# Patient Record
Sex: FEMALE | Race: OTHER | HISPANIC OR LATINO | URBAN - METROPOLITAN AREA
[De-identification: names, ages, dates, MRNs, and addresses within clinical notes are randomized per-mention and may not be internally consistent; named-entity substitution may affect disease eponyms.]

---

## 2017-03-27 ENCOUNTER — EMERGENCY (EMERGENCY)
Facility: HOSPITAL | Age: 22
LOS: 1 days | Discharge: HOME | End: 2017-03-27
Attending: EMERGENCY MEDICINE | Admitting: EMERGENCY MEDICINE
Payer: MEDICAID

## 2017-03-27 VITALS
TEMPERATURE: 98 F | HEART RATE: 88 BPM | SYSTOLIC BLOOD PRESSURE: 107 MMHG | DIASTOLIC BLOOD PRESSURE: 55 MMHG | RESPIRATION RATE: 16 BRPM | OXYGEN SATURATION: 99 %

## 2017-03-27 VITALS
HEART RATE: 102 BPM | RESPIRATION RATE: 16 BRPM | OXYGEN SATURATION: 99 % | SYSTOLIC BLOOD PRESSURE: 110 MMHG | DIASTOLIC BLOOD PRESSURE: 68 MMHG | TEMPERATURE: 99 F

## 2017-03-27 PROCEDURE — 99284 EMERGENCY DEPT VISIT MOD MDM: CPT

## 2017-03-27 RX ORDER — SODIUM CHLORIDE 9 MG/ML
1000 INJECTION INTRAMUSCULAR; INTRAVENOUS; SUBCUTANEOUS ONCE
Qty: 0 | Refills: 0 | Status: COMPLETED | OUTPATIENT
Start: 2017-03-27 | End: 2017-03-27

## 2017-03-27 NOTE — ED ADULT NURSE NOTE - CHIEF COMPLAINT QUOTE
pt c/o blood in urine x a day. states lower abdominal pain when urinates on left side and bilateral flank pain. pt appears comfortable and in NAD.

## 2017-03-27 NOTE — ED PROVIDER NOTE - CARE PLAN
Principal Discharge DX:	Abdominal pain affecting pregnancy  Instructions for follow-up, activity and diet:	pls rest, dink plenty of fluids, f/u with your pbgyn as soon as possible, return for any worsening symptoms or any other concnerning symptoms

## 2017-03-27 NOTE — ED PROVIDER NOTE - PLAN OF CARE
pls rest, dink plenty of fluids, f/u with your pbgyn as soon as possible, return for any worsening symptoms or any other concnerning symptoms

## 2017-03-27 NOTE — ED PROVIDER NOTE - OBJECTIVE STATEMENT
23 y/o female, , 18 weeks pregnant, +IUP, p/w hematuria, dysuria, urinary frequency, urinary urgency, and flank pain x 3 days. Pt reports light increase of abd pain compared to her baseline. Notes vomiting at baseline for her pregnancy. Admits to some chills. Denies diarrhea, fever, and any other complaints.

## 2017-03-27 NOTE — ED ADULT NURSE NOTE - OBJECTIVE STATEMENT
Pt received in intake room 7 with reports of  'a little but of bleeding'. Pt states 'I went to the bathroom and noticed a little bit of blood on my underwear so I think its coming from my vagina'. Pt primarily Chinese speaking, requests , pt assessed by RN using  number [id number]. Pt reporting she is 18 weeks pregnant with first pregnancy, states this is the first time she has bled during pregnancy. Pt reporting 8/10 abdominal pain and back pain, pt states she had this pain throughout pregnancy. Pt also reporting dysuria. Pt awake, A&Ox3, reporting weakness since 4th week of pregnancy. Visitors at bedside. 20g PIV placed in R AC, labs drawn and sent per orders. VS documented per flow, safety maintained. Pt received in intake room 7 with reports of  'a little bit of bleeding'. Pt states 'I went to the bathroom and noticed a little bit of blood on my underwear so I think its coming from my vagina'. Pt primarily Khmer speaking, requests , pt assessed by RN using  number [id number]. Pt reporting she is 18 weeks pregnant with first pregnancy, states this is the first time she has bled during pregnancy. Pt reporting 8/10 abdominal pain and back pain, pt states she had this pain throughout pregnancy. Pt also reporting dysuria. Pt awake, A&Ox3, reporting weakness since 4th week of pregnancy. Visitors at bedside. 20g PIV placed in R AC, labs drawn and sent per orders. VS documented per flow, safety maintained.

## 2017-03-28 LAB
ALBUMIN SERPL ELPH-MCNC: 3.7 G/DL — SIGNIFICANT CHANGE UP (ref 3.3–5)
ALP SERPL-CCNC: 69 U/L — SIGNIFICANT CHANGE UP (ref 40–120)
ALT FLD-CCNC: 31 U/L — SIGNIFICANT CHANGE UP (ref 4–33)
APPEARANCE UR: CLEAR — SIGNIFICANT CHANGE UP
AST SERPL-CCNC: 32 U/L — SIGNIFICANT CHANGE UP (ref 4–32)
BACTERIA # UR AUTO: SIGNIFICANT CHANGE UP
BASOPHILS # BLD AUTO: 0.03 K/UL — SIGNIFICANT CHANGE UP (ref 0–0.2)
BASOPHILS NFR BLD AUTO: 0.2 % — SIGNIFICANT CHANGE UP (ref 0–2)
BILIRUB SERPL-MCNC: 0.3 MG/DL — SIGNIFICANT CHANGE UP (ref 0.2–1.2)
BILIRUB UR-MCNC: NEGATIVE — SIGNIFICANT CHANGE UP
BLOOD UR QL VISUAL: NEGATIVE — SIGNIFICANT CHANGE UP
BUN SERPL-MCNC: 6 MG/DL — LOW (ref 7–23)
CALCIUM SERPL-MCNC: 9.2 MG/DL — SIGNIFICANT CHANGE UP (ref 8.4–10.5)
CHLORIDE SERPL-SCNC: 101 MMOL/L — SIGNIFICANT CHANGE UP (ref 98–107)
CO2 SERPL-SCNC: 21 MMOL/L — LOW (ref 22–31)
COLOR SPEC: SIGNIFICANT CHANGE UP
CREAT SERPL-MCNC: 0.44 MG/DL — LOW (ref 0.5–1.3)
EOSINOPHIL # BLD AUTO: 0.4 K/UL — SIGNIFICANT CHANGE UP (ref 0–0.5)
EOSINOPHIL NFR BLD AUTO: 2.8 % — SIGNIFICANT CHANGE UP (ref 0–6)
GLUCOSE SERPL-MCNC: 74 MG/DL — SIGNIFICANT CHANGE UP (ref 70–99)
GLUCOSE UR-MCNC: NEGATIVE — SIGNIFICANT CHANGE UP
HCG SERPL-ACNC: SIGNIFICANT CHANGE UP MIU/ML
HCT VFR BLD CALC: 32.9 % — LOW (ref 34.5–45)
HGB BLD-MCNC: 10.9 G/DL — LOW (ref 11.5–15.5)
IMM GRANULOCYTES NFR BLD AUTO: 0.7 % — SIGNIFICANT CHANGE UP (ref 0–1.5)
KETONES UR-MCNC: NEGATIVE — SIGNIFICANT CHANGE UP
LEUKOCYTE ESTERASE UR-ACNC: NEGATIVE — SIGNIFICANT CHANGE UP
LYMPHOCYTES # BLD AUTO: 23.4 % — SIGNIFICANT CHANGE UP (ref 13–44)
LYMPHOCYTES # BLD AUTO: 3.35 K/UL — HIGH (ref 1–3.3)
MCHC RBC-ENTMCNC: 30.4 PG — SIGNIFICANT CHANGE UP (ref 27–34)
MCHC RBC-ENTMCNC: 33.1 % — SIGNIFICANT CHANGE UP (ref 32–36)
MCV RBC AUTO: 91.6 FL — SIGNIFICANT CHANGE UP (ref 80–100)
MONOCYTES # BLD AUTO: 0.95 K/UL — HIGH (ref 0–0.9)
MONOCYTES NFR BLD AUTO: 6.6 % — SIGNIFICANT CHANGE UP (ref 2–14)
MUCOUS THREADS # UR AUTO: SIGNIFICANT CHANGE UP
NEUTROPHILS # BLD AUTO: 9.49 K/UL — HIGH (ref 1.8–7.4)
NEUTROPHILS NFR BLD AUTO: 66.3 % — SIGNIFICANT CHANGE UP (ref 43–77)
NITRITE UR-MCNC: NEGATIVE — SIGNIFICANT CHANGE UP
PH UR: 6.5 — SIGNIFICANT CHANGE UP (ref 4.6–8)
PLATELET # BLD AUTO: 254 K/UL — SIGNIFICANT CHANGE UP (ref 150–400)
PMV BLD: 11.5 FL — SIGNIFICANT CHANGE UP (ref 7–13)
POTASSIUM SERPL-MCNC: 3.8 MMOL/L — SIGNIFICANT CHANGE UP (ref 3.5–5.3)
POTASSIUM SERPL-SCNC: 3.8 MMOL/L — SIGNIFICANT CHANGE UP (ref 3.5–5.3)
PROT SERPL-MCNC: 6.7 G/DL — SIGNIFICANT CHANGE UP (ref 6–8.3)
PROT UR-MCNC: NEGATIVE — SIGNIFICANT CHANGE UP
RBC # BLD: 3.59 M/UL — LOW (ref 3.8–5.2)
RBC # FLD: 13.9 % — SIGNIFICANT CHANGE UP (ref 10.3–14.5)
RBC CASTS # UR COMP ASSIST: SIGNIFICANT CHANGE UP (ref 0–?)
SODIUM SERPL-SCNC: 139 MMOL/L — SIGNIFICANT CHANGE UP (ref 135–145)
SP GR SPEC: 1.01 — SIGNIFICANT CHANGE UP (ref 1–1.03)
SQUAMOUS # UR AUTO: SIGNIFICANT CHANGE UP
UROBILINOGEN FLD QL: NORMAL E.U. — SIGNIFICANT CHANGE UP (ref 0.1–0.2)
WBC # BLD: 14.32 K/UL — HIGH (ref 3.8–10.5)
WBC # FLD AUTO: 14.32 K/UL — HIGH (ref 3.8–10.5)
WBC UR QL: SIGNIFICANT CHANGE UP (ref 0–?)

## 2017-03-28 PROCEDURE — 76830 TRANSVAGINAL US NON-OB: CPT | Mod: 26

## 2017-03-28 RX ADMIN — SODIUM CHLORIDE 1000 MILLILITER(S): 9 INJECTION INTRAMUSCULAR; INTRAVENOUS; SUBCUTANEOUS at 00:10

## 2017-03-29 LAB
BACTERIA UR CULT: SIGNIFICANT CHANGE UP
SPECIMEN SOURCE: SIGNIFICANT CHANGE UP

## 2017-04-11 ENCOUNTER — ASOB RESULT (OUTPATIENT)
Age: 22
End: 2017-04-11

## 2017-04-11 ENCOUNTER — APPOINTMENT (OUTPATIENT)
Dept: ANTEPARTUM | Facility: CLINIC | Age: 22
End: 2017-04-11

## 2017-09-03 ENCOUNTER — TRANSCRIPTION ENCOUNTER (OUTPATIENT)
Age: 22
End: 2017-09-03

## 2017-09-03 ENCOUNTER — INPATIENT (INPATIENT)
Facility: HOSPITAL | Age: 22
LOS: 3 days | Discharge: HOME | End: 2017-09-07
Attending: OBSTETRICS & GYNECOLOGY | Admitting: OBSTETRICS & GYNECOLOGY

## 2017-09-03 VITALS — WEIGHT: 121.25 LBS

## 2017-09-03 DIAGNOSIS — O26.899 OTHER SPECIFIED PREGNANCY RELATED CONDITIONS, UNSPECIFIED TRIMESTER: ICD-10-CM

## 2017-09-03 DIAGNOSIS — O48.0 POST-TERM PREGNANCY: ICD-10-CM

## 2017-09-03 LAB
BASOPHILS # BLD AUTO: 0.03 K/UL — SIGNIFICANT CHANGE UP (ref 0–0.2)
BASOPHILS NFR BLD AUTO: 0.3 % — SIGNIFICANT CHANGE UP (ref 0–2)
EOSINOPHIL # BLD AUTO: 0.18 K/UL — SIGNIFICANT CHANGE UP (ref 0–0.5)
EOSINOPHIL NFR BLD AUTO: 1.8 % — SIGNIFICANT CHANGE UP (ref 0–6)
HCT VFR BLD CALC: 35.8 % — SIGNIFICANT CHANGE UP (ref 34.5–45)
HGB BLD-MCNC: 11.7 G/DL — SIGNIFICANT CHANGE UP (ref 11.5–15.5)
IMM GRANULOCYTES # BLD AUTO: 0.06 # — SIGNIFICANT CHANGE UP
IMM GRANULOCYTES NFR BLD AUTO: 0.6 % — SIGNIFICANT CHANGE UP (ref 0–1.5)
LYMPHOCYTES # BLD AUTO: 2.5 K/UL — SIGNIFICANT CHANGE UP (ref 1–3.3)
LYMPHOCYTES # BLD AUTO: 25.7 % — SIGNIFICANT CHANGE UP (ref 13–44)
MCHC RBC-ENTMCNC: 29.8 PG — SIGNIFICANT CHANGE UP (ref 27–34)
MCHC RBC-ENTMCNC: 32.7 % — SIGNIFICANT CHANGE UP (ref 32–36)
MCV RBC AUTO: 91.3 FL — SIGNIFICANT CHANGE UP (ref 80–100)
MONOCYTES # BLD AUTO: 0.59 K/UL — SIGNIFICANT CHANGE UP (ref 0–0.9)
MONOCYTES NFR BLD AUTO: 6.1 % — SIGNIFICANT CHANGE UP (ref 2–14)
NEUTROPHILS # BLD AUTO: 6.37 K/UL — SIGNIFICANT CHANGE UP (ref 1.8–7.4)
NEUTROPHILS NFR BLD AUTO: 65.5 % — SIGNIFICANT CHANGE UP (ref 43–77)
NRBC # FLD: 0 — SIGNIFICANT CHANGE UP
PLATELET # BLD AUTO: 126 K/UL — LOW (ref 150–400)
PMV BLD: 14.3 FL — HIGH (ref 7–13)
RBC # BLD: 3.92 M/UL — SIGNIFICANT CHANGE UP (ref 3.8–5.2)
RBC # FLD: 14.9 % — HIGH (ref 10.3–14.5)
RH IG SCN BLD-IMP: POSITIVE — SIGNIFICANT CHANGE UP
WBC # BLD: 9.73 K/UL — SIGNIFICANT CHANGE UP (ref 3.8–10.5)
WBC # FLD AUTO: 9.73 K/UL — SIGNIFICANT CHANGE UP (ref 3.8–10.5)

## 2017-09-03 RX ORDER — OXYTOCIN 10 UNIT/ML
333.33 VIAL (ML) INJECTION
Qty: 20 | Refills: 0 | Status: DISCONTINUED | OUTPATIENT
Start: 2017-09-03 | End: 2017-09-03

## 2017-09-03 RX ORDER — SODIUM CHLORIDE 9 MG/ML
1000 INJECTION, SOLUTION INTRAVENOUS ONCE
Qty: 0 | Refills: 0 | Status: DISCONTINUED | OUTPATIENT
Start: 2017-09-03 | End: 2017-09-03

## 2017-09-03 RX ORDER — CITRIC ACID/SODIUM CITRATE 300-500 MG
15 SOLUTION, ORAL ORAL EVERY 4 HOURS
Qty: 0 | Refills: 0 | Status: DISCONTINUED | OUTPATIENT
Start: 2017-09-03 | End: 2017-09-03

## 2017-09-03 RX ORDER — SODIUM CHLORIDE 9 MG/ML
1000 INJECTION, SOLUTION INTRAVENOUS ONCE
Qty: 0 | Refills: 0 | Status: DISCONTINUED | OUTPATIENT
Start: 2017-09-03 | End: 2017-09-04

## 2017-09-03 RX ORDER — SODIUM CHLORIDE 9 MG/ML
1000 INJECTION, SOLUTION INTRAVENOUS
Qty: 0 | Refills: 0 | Status: DISCONTINUED | OUTPATIENT
Start: 2017-09-03 | End: 2017-09-03

## 2017-09-03 RX ORDER — OXYTOCIN 10 UNIT/ML
333.33 VIAL (ML) INJECTION
Qty: 20 | Refills: 0 | Status: DISCONTINUED | OUTPATIENT
Start: 2017-09-03 | End: 2017-09-04

## 2017-09-03 RX ORDER — CITRIC ACID/SODIUM CITRATE 300-500 MG
15 SOLUTION, ORAL ORAL EVERY 4 HOURS
Qty: 0 | Refills: 0 | Status: DISCONTINUED | OUTPATIENT
Start: 2017-09-03 | End: 2017-09-04

## 2017-09-03 RX ORDER — OXYTOCIN 10 UNIT/ML
2 VIAL (ML) INJECTION
Qty: 30 | Refills: 0 | Status: DISCONTINUED | OUTPATIENT
Start: 2017-09-03 | End: 2017-09-04

## 2017-09-03 RX ORDER — SODIUM CHLORIDE 9 MG/ML
1000 INJECTION, SOLUTION INTRAVENOUS
Qty: 0 | Refills: 0 | Status: DISCONTINUED | OUTPATIENT
Start: 2017-09-03 | End: 2017-09-04

## 2017-09-03 RX ADMIN — Medication 2 MILLIUNIT(S)/MIN: at 17:05

## 2017-09-03 RX ADMIN — SODIUM CHLORIDE 125 MILLILITER(S): 9 INJECTION, SOLUTION INTRAVENOUS at 11:01

## 2017-09-03 RX ADMIN — SODIUM CHLORIDE 125 MILLILITER(S): 9 INJECTION, SOLUTION INTRAVENOUS at 19:13

## 2017-09-03 NOTE — DISCHARGE NOTE OB - CARE PROVIDER_API CALL
Karolina Campbell (DO), Gynecology Obstetrics  Gynecology  [address] [place] [address]  Phone: [phone]  Fax: [phone]

## 2017-09-03 NOTE — DISCHARGE NOTE OB - CARE PLAN
Principal Discharge DX:	Spontaneous vaginal delivery  Goal:	Tolerating regular diet; resume normal activity  Instructions for follow-up, activity and diet:	Routine

## 2017-09-03 NOTE — DISCHARGE NOTE OB - PATIENT PORTAL LINK FT
“You can access the [employer] Patient Portal, offered by [facility], by registering with the following website: [email or web address]”

## 2017-09-03 NOTE — DISCHARGE NOTE OB - MATERIALS PROVIDED
Vaccinations/[facility] Hearing Screen Program/Tdap Vaccination (VIS Pub Date: January 24, 2012)/Back To Sleep Handout/Guide to Postpartum Care/Breastfeeding Log

## 2017-09-03 NOTE — DISCHARGE NOTE OB - HOSPITAL COURSE
23 yo  at 41+3 week presented to labor and delivery for induction of labor for post dates. PO cytotec and cervical jimenes placed. Following cervical jimenes, patient was started on pitocin. 21 yo  at 41+3 week presented to labor and delivery for induction of labor for post dates. PO cytotec and cervical jimenes placed. Following cervical jimenes, patient was started on pitocin. SROM- clear. Patient made no cervical -  section performed for CPD.

## 2017-09-04 ENCOUNTER — TRANSCRIPTION ENCOUNTER (OUTPATIENT)
Age: 22
End: 2017-09-04

## 2017-09-04 LAB
HCT VFR BLD CALC: 28.3 % — LOW (ref 34.5–45)
HGB BLD-MCNC: 9.3 G/DL — LOW (ref 11.5–15.5)
MCHC RBC-ENTMCNC: 29.8 PG — SIGNIFICANT CHANGE UP (ref 27–34)
MCHC RBC-ENTMCNC: 32.9 % — SIGNIFICANT CHANGE UP (ref 32–36)
MCV RBC AUTO: 90.7 FL — SIGNIFICANT CHANGE UP (ref 80–100)
NRBC # FLD: 0 — SIGNIFICANT CHANGE UP
PLATELET # BLD AUTO: 114 K/UL — LOW (ref 150–400)
PMV BLD: 13.3 FL — HIGH (ref 7–13)
RBC # BLD: 3.12 M/UL — LOW (ref 3.8–5.2)
RBC # FLD: 15 % — HIGH (ref 10.3–14.5)
T PALLIDUM AB TITR SER: NEGATIVE — SIGNIFICANT CHANGE UP
WBC # BLD: 10.33 K/UL — SIGNIFICANT CHANGE UP (ref 3.8–10.5)
WBC # FLD AUTO: 10.33 K/UL — SIGNIFICANT CHANGE UP (ref 3.8–10.5)

## 2017-09-04 RX ORDER — TETANUS TOXOID, REDUCED DIPHTHERIA TOXOID AND ACELLULAR PERTUSSIS VACCINE, ADSORBED 5; 2.5; 8; 8; 2.5 [IU]/.5ML; [IU]/.5ML; UG/.5ML; UG/.5ML; UG/.5ML
0.5 SUSPENSION INTRAMUSCULAR ONCE
Qty: 0 | Refills: 0 | Status: DISCONTINUED | OUTPATIENT
Start: 2017-09-04 | End: 2017-09-07

## 2017-09-04 RX ORDER — HEPARIN SODIUM 5000 [USP'U]/ML
5000 INJECTION INTRAVENOUS; SUBCUTANEOUS EVERY 12 HOURS
Qty: 0 | Refills: 0 | Status: DISCONTINUED | OUTPATIENT
Start: 2017-09-04 | End: 2017-09-07

## 2017-09-04 RX ORDER — OXYTOCIN 10 UNIT/ML
41.67 VIAL (ML) INJECTION
Qty: 20 | Refills: 0 | Status: DISCONTINUED | OUTPATIENT
Start: 2017-09-04 | End: 2017-09-04

## 2017-09-04 RX ORDER — HYDROMORPHONE HYDROCHLORIDE 2 MG/ML
0.5 INJECTION INTRAMUSCULAR; INTRAVENOUS; SUBCUTANEOUS
Qty: 0 | Refills: 0 | Status: DISCONTINUED | OUTPATIENT
Start: 2017-09-04 | End: 2017-09-04

## 2017-09-04 RX ORDER — CITRIC ACID/SODIUM CITRATE 300-500 MG
30 SOLUTION, ORAL ORAL ONCE
Qty: 0 | Refills: 0 | Status: DISCONTINUED | OUTPATIENT
Start: 2017-09-04 | End: 2017-09-04

## 2017-09-04 RX ORDER — OXYTOCIN 10 UNIT/ML
333.33 VIAL (ML) INJECTION
Qty: 20 | Refills: 0 | Status: DISCONTINUED | OUTPATIENT
Start: 2017-09-04 | End: 2017-09-04

## 2017-09-04 RX ORDER — KETOROLAC TROMETHAMINE 30 MG/ML
30 SYRINGE (ML) INJECTION EVERY 6 HOURS
Qty: 0 | Refills: 0 | Status: DISCONTINUED | OUTPATIENT
Start: 2017-09-04 | End: 2017-09-04

## 2017-09-04 RX ORDER — HYDROMORPHONE HYDROCHLORIDE 2 MG/ML
0.5 INJECTION INTRAMUSCULAR; INTRAVENOUS; SUBCUTANEOUS
Qty: 0 | Refills: 0 | Status: DISCONTINUED | OUTPATIENT
Start: 2017-09-04 | End: 2017-09-05

## 2017-09-04 RX ORDER — FENTANYL CITRATE 50 UG/ML
50 INJECTION INTRAVENOUS
Qty: 0 | Refills: 0 | Status: DISCONTINUED | OUTPATIENT
Start: 2017-09-04 | End: 2017-09-04

## 2017-09-04 RX ORDER — ONDANSETRON 8 MG/1
4 TABLET, FILM COATED ORAL ONCE
Qty: 0 | Refills: 0 | Status: DISCONTINUED | OUTPATIENT
Start: 2017-09-04 | End: 2017-09-04

## 2017-09-04 RX ORDER — DIPHENHYDRAMINE HCL 50 MG
25 CAPSULE ORAL EVERY 4 HOURS
Qty: 0 | Refills: 0 | Status: DISCONTINUED | OUTPATIENT
Start: 2017-09-04 | End: 2017-09-05

## 2017-09-04 RX ORDER — LANOLIN
1 OINTMENT (GRAM) TOPICAL
Qty: 0 | Refills: 0 | Status: DISCONTINUED | OUTPATIENT
Start: 2017-09-04 | End: 2017-09-07

## 2017-09-04 RX ORDER — FERROUS SULFATE 325(65) MG
325 TABLET ORAL DAILY
Qty: 0 | Refills: 0 | Status: DISCONTINUED | OUTPATIENT
Start: 2017-09-04 | End: 2017-09-05

## 2017-09-04 RX ORDER — NALOXONE HYDROCHLORIDE 4 MG/.1ML
0.1 SPRAY NASAL
Qty: 0 | Refills: 0 | Status: DISCONTINUED | OUTPATIENT
Start: 2017-09-04 | End: 2017-09-05

## 2017-09-04 RX ORDER — ONDANSETRON 8 MG/1
4 TABLET, FILM COATED ORAL EVERY 6 HOURS
Qty: 0 | Refills: 0 | Status: DISCONTINUED | OUTPATIENT
Start: 2017-09-04 | End: 2017-09-05

## 2017-09-04 RX ORDER — SODIUM CHLORIDE 9 MG/ML
1000 INJECTION, SOLUTION INTRAVENOUS
Qty: 0 | Refills: 0 | Status: DISCONTINUED | OUTPATIENT
Start: 2017-09-04 | End: 2017-09-05

## 2017-09-04 RX ORDER — METOCLOPRAMIDE HCL 10 MG
10 TABLET ORAL ONCE
Qty: 0 | Refills: 0 | Status: COMPLETED | OUTPATIENT
Start: 2017-09-04 | End: 2017-09-04

## 2017-09-04 RX ORDER — OXYCODONE HYDROCHLORIDE 5 MG/1
5 TABLET ORAL EVERY 4 HOURS
Qty: 0 | Refills: 0 | Status: COMPLETED | OUTPATIENT
Start: 2017-09-04 | End: 2017-09-11

## 2017-09-04 RX ORDER — SIMETHICONE 80 MG/1
80 TABLET, CHEWABLE ORAL EVERY 4 HOURS
Qty: 0 | Refills: 0 | Status: DISCONTINUED | OUTPATIENT
Start: 2017-09-04 | End: 2017-09-07

## 2017-09-04 RX ORDER — GLYCERIN ADULT
1 SUPPOSITORY, RECTAL RECTAL AT BEDTIME
Qty: 0 | Refills: 0 | Status: DISCONTINUED | OUTPATIENT
Start: 2017-09-04 | End: 2017-09-07

## 2017-09-04 RX ORDER — SODIUM CHLORIDE 9 MG/ML
1000 INJECTION, SOLUTION INTRAVENOUS
Qty: 0 | Refills: 0 | Status: DISCONTINUED | OUTPATIENT
Start: 2017-09-04 | End: 2017-09-04

## 2017-09-04 RX ORDER — DOCUSATE SODIUM 100 MG
100 CAPSULE ORAL
Qty: 0 | Refills: 0 | Status: DISCONTINUED | OUTPATIENT
Start: 2017-09-04 | End: 2017-09-05

## 2017-09-04 RX ORDER — DIPHENHYDRAMINE HCL 50 MG
25 CAPSULE ORAL EVERY 6 HOURS
Qty: 0 | Refills: 0 | Status: DISCONTINUED | OUTPATIENT
Start: 2017-09-04 | End: 2017-09-07

## 2017-09-04 RX ORDER — OXYCODONE HYDROCHLORIDE 5 MG/1
5 TABLET ORAL
Qty: 0 | Refills: 0 | Status: DISCONTINUED | OUTPATIENT
Start: 2017-09-04 | End: 2017-09-05

## 2017-09-04 RX ORDER — ACETAMINOPHEN 500 MG
975 TABLET ORAL EVERY 6 HOURS
Qty: 0 | Refills: 0 | Status: DISCONTINUED | OUTPATIENT
Start: 2017-09-04 | End: 2017-09-07

## 2017-09-04 RX ORDER — FENTANYL CITRATE 50 UG/ML
25 INJECTION INTRAVENOUS
Qty: 0 | Refills: 0 | Status: DISCONTINUED | OUTPATIENT
Start: 2017-09-04 | End: 2017-09-04

## 2017-09-04 RX ORDER — OXYCODONE HYDROCHLORIDE 5 MG/1
10 TABLET ORAL
Qty: 0 | Refills: 0 | Status: DISCONTINUED | OUTPATIENT
Start: 2017-09-04 | End: 2017-09-05

## 2017-09-04 RX ORDER — INFLUENZA VIRUS VACCINE 15; 15; 15; 15 UG/.5ML; UG/.5ML; UG/.5ML; UG/.5ML
0.5 SUSPENSION INTRAMUSCULAR ONCE
Qty: 0 | Refills: 0 | Status: DISCONTINUED | OUTPATIENT
Start: 2017-09-04 | End: 2017-09-07

## 2017-09-04 RX ORDER — FAMOTIDINE 10 MG/ML
20 INJECTION INTRAVENOUS ONCE
Qty: 0 | Refills: 0 | Status: COMPLETED | OUTPATIENT
Start: 2017-09-04 | End: 2017-09-04

## 2017-09-04 RX ORDER — OXYCODONE HYDROCHLORIDE 5 MG/1
5 TABLET ORAL
Qty: 0 | Refills: 0 | Status: COMPLETED | OUTPATIENT
Start: 2017-09-04 | End: 2017-09-11

## 2017-09-04 RX ORDER — KETOROLAC TROMETHAMINE 30 MG/ML
30 SYRINGE (ML) INJECTION EVERY 6 HOURS
Qty: 0 | Refills: 0 | Status: DISCONTINUED | OUTPATIENT
Start: 2017-09-04 | End: 2017-09-05

## 2017-09-04 RX ORDER — IBUPROFEN 200 MG
600 TABLET ORAL EVERY 6 HOURS
Qty: 0 | Refills: 0 | Status: DISCONTINUED | OUTPATIENT
Start: 2017-09-04 | End: 2017-09-04

## 2017-09-04 RX ADMIN — HEPARIN SODIUM 5000 UNIT(S): 5000 INJECTION INTRAVENOUS; SUBCUTANEOUS at 11:27

## 2017-09-04 RX ADMIN — Medication 975 MILLIGRAM(S): at 11:21

## 2017-09-04 RX ADMIN — Medication 125 MILLIUNIT(S)/MIN: at 04:44

## 2017-09-04 RX ADMIN — Medication 30 MILLIGRAM(S): at 18:40

## 2017-09-04 RX ADMIN — Medication 1 TABLET(S): at 11:21

## 2017-09-04 RX ADMIN — Medication 15 MILLILITER(S): at 01:30

## 2017-09-04 RX ADMIN — FAMOTIDINE 20 MILLIGRAM(S): 10 INJECTION INTRAVENOUS at 01:08

## 2017-09-04 RX ADMIN — Medication 30 MILLIGRAM(S): at 11:20

## 2017-09-04 RX ADMIN — Medication 100 MILLIGRAM(S): at 11:21

## 2017-09-04 RX ADMIN — Medication 975 MILLIGRAM(S): at 18:39

## 2017-09-04 RX ADMIN — Medication 10 MILLIGRAM(S): at 01:08

## 2017-09-04 RX ADMIN — Medication 30 MILLIGRAM(S): at 12:00

## 2017-09-04 RX ADMIN — Medication 30 MILLIGRAM(S): at 19:40

## 2017-09-04 NOTE — PROGRESS NOTE ADULT - SUBJECTIVE AND OBJECTIVE BOX
Post-Operative Note, C/S  She is a  22y woman who is now post-operative day 1:     Subjective:  The patient feels well.  She is ambulating.   She is tolerating regular diet.  She denies nausea and vomiting; denies fever.  She is voiding.  Her pain is controlled; incisional pain is appropriate.  She reports normal postpartum bleeding.  She is breastfeeding.  She is formula feeding.    Physical exam:    Vital Signs Last 24 Hrs  T(C): 37.1 (04 Sep 2017 10:00), Max: 37.1 (04 Sep 2017 10:00)  T(F): 98.8 (04 Sep 2017 10:00), Max: 98.8 (04 Sep 2017 10:00)  HR: 73 (04 Sep 2017 10:00) (68 - 94)  BP: 136/79 (04 Sep 2017 10:00) (112/66 - 136/79)  BP(mean): 80 (04 Sep 2017 06:00) (74 - 98)  RR: 18 (04 Sep 2017 10:00) (8 - 20)  SpO2: 98% (04 Sep 2017 10:00) (96% - 100%)    Gen: NAD  Breast: Soft, nontender, not engorged.  Abdomen: Soft, nontender, no distension , firm uterine fundus at umbilicus.  Incision: C/D/I.  Pelvic: Normal lochia noted  Ext: No calf tenderness    LABS:                        11.7   9.73  )-----------( 126      ( 03 Sep 2017 09:40 )             35.8       Rubella status:     Allergies    No Known Allergies    Intolerances      MEDICATIONS  (STANDING):  oxytocin Infusion 333.333 milliUNIT(s)/Min (1000 mL/Hr) IV Continuous <Continuous>  lactated ringers. 1000 milliLiter(s) (125 mL/Hr) IV Continuous <Continuous>  acetaminophen   Tablet 975 milliGRAM(s) Oral every 6 hours  ibuprofen  Tablet 600 milliGRAM(s) Oral every 6 hours  diphtheria/tetanus/pertussis (acellular) Vaccine (ADAcel) 0.5 milliLiter(s) IntraMuscular once  oxytocin Infusion 41.667 milliUNIT(s)/Min (125 mL/Hr) IV Continuous <Continuous>  ferrous    sulfate 325 milliGRAM(s) Oral daily  prenatal multivitamin 1 Tablet(s) Oral daily  oxyCODONE    IR 5 milliGRAM(s) Oral every 3 hours  heparin  Injectable 5000 Unit(s) SubCutaneous every 12 hours  influenza   Vaccine 0.5 milliLiter(s) IntraMuscular once  ketorolac   Injectable 30 milliGRAM(s) IV Push every 6 hours    MEDICATIONS  (PRN):  fentaNYL    Injectable 25 MICROGram(s) IV Push every 5 minutes PRN Mild Pain (1 - 3)  fentaNYL    Injectable 50 MICROGram(s) IV Push every 5 minutes PRN Moderate Pain (4 - 6)  HYDROmorphone  Injectable 0.5 milliGRAM(s) IV Push every 10 minutes PRN Severe Pain (7 - 10)  ondansetron Injectable 4 milliGRAM(s) IV Push once PRN Nausea and/or Vomiting  oxyCODONE    IR 5 milliGRAM(s) Oral every 3 hours PRN Mild Pain  oxyCODONE    IR 10 milliGRAM(s) Oral every 3 hours PRN Moderate Pain  HYDROmorphone  Injectable 0.5 milliGRAM(s) IV Push every 3 hours PRN Severe Pain  naloxone Injectable 0.1 milliGRAM(s) IV Push every 3 minutes PRN For ANY of the following changes in patient status:  A. RR LESS THAN 10 breaths per minute, B. Oxygen saturation LESS THAN 90%, C. Sedation score of 6  ondansetron Injectable 4 milliGRAM(s) IV Push every 6 hours PRN Nausea  diphenhydrAMINE   Injectable 25 milliGRAM(s) IV Push every 4 hours PRN Pruritus  simethicone 80 milliGRAM(s) Chew every 4 hours PRN Gas  diphenhydrAMINE   Capsule 25 milliGRAM(s) Oral every 6 hours PRN Itching  glycerin Suppository - Adult 1 Suppository(s) Rectal at bedtime PRN Constipation  docusate sodium 100 milliGRAM(s) Oral two times a day PRN Stool Softening  lanolin Ointment 1 Application(s) Topical every 3 hours PRN Sore Nipples  oxyCODONE    IR 5 milliGRAM(s) Oral every 4 hours PRN Severe Pain (7 - 10)        Assessment and Plan  POD #1 s/p C/S.  Doing well.  Encourage ambulation.  Incisional care and PO instructions reviewed.  [facility].

## 2017-09-05 RX ORDER — OXYCODONE HYDROCHLORIDE 5 MG/1
5 TABLET ORAL EVERY 4 HOURS
Qty: 0 | Refills: 0 | Status: DISCONTINUED | OUTPATIENT
Start: 2017-09-05 | End: 2017-09-07

## 2017-09-05 RX ORDER — DOCUSATE SODIUM 100 MG
100 CAPSULE ORAL
Qty: 0 | Refills: 0 | Status: DISCONTINUED | OUTPATIENT
Start: 2017-09-05 | End: 2017-09-07

## 2017-09-05 RX ORDER — FERROUS SULFATE 325(65) MG
325 TABLET ORAL
Qty: 0 | Refills: 0 | Status: DISCONTINUED | OUTPATIENT
Start: 2017-09-05 | End: 2017-09-07

## 2017-09-05 RX ORDER — OXYCODONE HYDROCHLORIDE 5 MG/1
5 TABLET ORAL
Qty: 0 | Refills: 0 | Status: DISCONTINUED | OUTPATIENT
Start: 2017-09-05 | End: 2017-09-07

## 2017-09-05 RX ORDER — IBUPROFEN 200 MG
600 TABLET ORAL EVERY 6 HOURS
Qty: 0 | Refills: 0 | Status: DISCONTINUED | OUTPATIENT
Start: 2017-09-05 | End: 2017-09-07

## 2017-09-05 RX ORDER — ASCORBIC ACID 60 MG
500 TABLET,CHEWABLE ORAL DAILY
Qty: 0 | Refills: 0 | Status: DISCONTINUED | OUTPATIENT
Start: 2017-09-05 | End: 2017-09-07

## 2017-09-05 RX ADMIN — Medication 1 TABLET(S): at 10:20

## 2017-09-05 RX ADMIN — Medication 600 MILLIGRAM(S): at 19:01

## 2017-09-05 RX ADMIN — Medication 100 MILLIGRAM(S): at 10:22

## 2017-09-05 RX ADMIN — Medication 325 MILLIGRAM(S): at 10:20

## 2017-09-05 RX ADMIN — Medication 975 MILLIGRAM(S): at 10:21

## 2017-09-05 RX ADMIN — HEPARIN SODIUM 5000 UNIT(S): 5000 INJECTION INTRAVENOUS; SUBCUTANEOUS at 00:02

## 2017-09-05 RX ADMIN — Medication 975 MILLIGRAM(S): at 05:27

## 2017-09-05 RX ADMIN — Medication 100 MILLIGRAM(S): at 05:28

## 2017-09-05 RX ADMIN — Medication 600 MILLIGRAM(S): at 10:20

## 2017-09-05 RX ADMIN — Medication 500 MILLIGRAM(S): at 10:21

## 2017-09-05 RX ADMIN — Medication 975 MILLIGRAM(S): at 18:05

## 2017-09-05 RX ADMIN — Medication 600 MILLIGRAM(S): at 18:05

## 2017-09-05 RX ADMIN — Medication 325 MILLIGRAM(S): at 18:06

## 2017-09-05 RX ADMIN — Medication 325 MILLIGRAM(S): at 09:43

## 2017-09-05 RX ADMIN — Medication 600 MILLIGRAM(S): at 10:52

## 2017-09-05 RX ADMIN — HEPARIN SODIUM 5000 UNIT(S): 5000 INJECTION INTRAVENOUS; SUBCUTANEOUS at 10:18

## 2017-09-05 NOTE — PROGRESS NOTE ADULT - PROBLEM SELECTOR PLAN 1
-Encourage Ambulation  -Continue with regular diet  -Heparin, SCDs, and ambulation for DVT ppx  -Discontinue jimenes  -Check CBC  -Analgesia as needed

## 2017-09-05 NOTE — PROGRESS NOTE ADULT - SUBJECTIVE AND OBJECTIVE BOX
Section/Postpartum-PCS   VENESSA DEAN is a  22y woman G1 P 1, who is now post-operative day: 1    Subjective:  The patient feels well.  She is ambulating.   She is tolerating regular diet.  She denies nausea and vomiting.  She is voiding.  Her pain is controlled.  She reports normal postpartum bleeding.  She is breastfeeding.  She is formula feeding.    Physical exam:    Vital Signs Last 24 Hrs  T(C): 37 (05 Sep 2017 05:01), Max: 37 (05 Sep 2017 05:01)  T(F): 98.6 (05 Sep 2017 05:01), Max: 98.6 (05 Sep 2017 05:01)  HR: 86 (05 Sep 2017 05:01) (81 - 86)  BP: 118/67 (05 Sep 2017 05:01) (95/65 - 118/67)  BP(mean): --  RR: 18 (05 Sep 2017 05:01) (18 - 19)  SpO2: 100% (05 Sep 2017 05:01) (99% - 100%)    General: alert and oriented in no acute distress.  Breast: Soft, nontender, not engorged.  Abdomen: Soft, nontender, not distended , firm uterine fundus at umbilicus, BS (+), Flatus (+), Bowel Movement (+)  Incision: Clean, dry, and intact, bandage has been removed  Pelvic: Normal lochia noted  Ext: No calf tenderness  Lochia: not excessive    LABS:                        9.3    10.33 )-----------( 114      ( 04 Sep 2017 18:30 )             28.3       Rubella status:  Immune    Blood Type:    Blood Typing (ABO + Rho D) (17 @ 10:03)    Rh Interpretation: Positive    ABO Interpretation: O                  Allergies    No Known Allergies    Intolerances      MEDICATIONS  (STANDING):  acetaminophen   Tablet 975 milliGRAM(s) Oral every 6 hours  diphtheria/tetanus/pertussis (acellular) Vaccine (ADAcel) 0.5 milliLiter(s) IntraMuscular once  prenatal multivitamin 1 Tablet(s) Oral daily  heparin  Injectable 5000 Unit(s) SubCutaneous every 12 hours  influenza   Vaccine 0.5 milliLiter(s) IntraMuscular once  oxyCODONE    IR 5 milliGRAM(s) Oral every 3 hours  ferrous    sulfate 325 milliGRAM(s) Oral three times a day with meals  docusate sodium 100 milliGRAM(s) Oral two times a day  ascorbic acid 500 milliGRAM(s) Oral daily  ibuprofen  Tablet 600 milliGRAM(s) Oral every 6 hours    MEDICATIONS  (PRN):  simethicone 80 milliGRAM(s) Chew every 4 hours PRN Gas  diphenhydrAMINE   Capsule 25 milliGRAM(s) Oral every 6 hours PRN Itching  glycerin Suppository - Adult 1 Suppository(s) Rectal at bedtime PRN Constipation  lanolin Ointment 1 Application(s) Topical every 3 hours PRN Sore Nipples  oxyCODONE    IR 5 milliGRAM(s) Oral every 4 hours PRN Severe Pain (7 - 10)        Assessment and Plan  POD #  1  s/p PCS  Doing well.  Encourage ambulation, may shower, routine postop care  Thrombocytopenia

## 2017-09-05 NOTE — PROGRESS NOTE ADULT - SUBJECTIVE AND OBJECTIVE BOX
ANESTHESIA POSTOP CHECK    22y Female POSTOP DAY 1 S/P     Vital Signs Last 24 Hrs  T(C): 37 (05 Sep 2017 05:01), Max: 37 (05 Sep 2017 05:01)  T(F): 98.6 (05 Sep 2017 05:01), Max: 98.6 (05 Sep 2017 05:01)  HR: 86 (05 Sep 2017 05:01) (81 - 86)  BP: 118/67 (05 Sep 2017 05:01) (95/65 - 118/67)  BP(mean): --  RR: 18 (05 Sep 2017 05:01) (18 - 19)  SpO2: 100% (05 Sep 2017 05:01) (99% - 100%)  I&O's Summary    04 Sep 2017 07:01  -  05 Sep 2017 07:00  --------------------------------------------------------  IN: 1000 mL / OUT: 2750 mL / NET: -1750 mL        [x NO APPARENT ANESTHESIA COMPLICATIONS      Comments:

## 2017-09-05 NOTE — PROGRESS NOTE ADULT - SUBJECTIVE AND OBJECTIVE BOX
Postpartum Note,  Section   22y year old woman post-operative day 1 from uncomplicated primary LTCS.  No acute events overnight.  Patient has no complaints and pain is well-controlled.  Patient is tolerating regular diet, passing flatus, ambulating, has a jimenes catheter in place.    Physical exam:    Vital Signs Last 24 Hrs  T(C): 37 (05 Sep 2017 05:01), Max: 37.1 (04 Sep 2017 10:00)  T(F): 98.6 (05 Sep 2017 05:), Max: 98.8 (04 Sep 2017 10:00)  HR: 86 (05 Sep 2017 05:) (73 - 86)  BP: 118/67 (05 Sep 2017 05:) (95/65 - 136/79)  BP(mean): --  RR: 18 (05 Sep 2017 05:) (18 - 19)  SpO2: 100% (05 Sep 2017 05:) (98% - 100%)    -04 @ 07:01  -  09-05 @ 07:00  --------------------------------------------------------  IN: 1000 mL / OUT: 2750 mL / NET: -1750 mL        Gen: NAD  Abdomen: Soft, nontender, no distension , firm uterine fundus at umbilicus.  Incision: Clean, dry, and intact with steri strips  Pelvic: Normal lochia noted  Ext: No calf tenderness    LABS:                        9.3    10.33 )-----------( 114      ( 04 Sep 2017 18:30 )             28.3                         11.7   9.73  )-----------( 126      ( 03 Sep 2017 09:40 )             35.8

## 2017-09-05 NOTE — PROGRESS NOTE ADULT - SUBJECTIVE AND OBJECTIVE BOX
Postop Day  __1_ s/p   C- Section    THERAPY:    [ x ] Spinal morphine _.2___ mg  [  ] Epidural morphine ___ mg  [  ] IV PCA Hydromophone 1 mg/ml    OBJECTIVE:    Sedation Score:	  [x  ] Alert	    [  ] Drowsy        [  ] Arousable	[  ] Asleep	[  ] Unresponsive    Side Effects:	  [ x ] None	     [  ] Nausea        [  ] Pruritus        [  ] Weaknes   [  ] Numbness   [  ] Other:        ASSESSMENT/ PLAN  [ x ] Continue   [  ] Discpntinue   [   Comments:

## 2017-09-06 LAB
HCT VFR BLD CALC: 29.2 % — LOW (ref 34.5–45)
HGB BLD-MCNC: 9.6 G/DL — LOW (ref 11.5–15.5)
MCHC RBC-ENTMCNC: 30 PG — SIGNIFICANT CHANGE UP (ref 27–34)
MCHC RBC-ENTMCNC: 32.9 % — SIGNIFICANT CHANGE UP (ref 32–36)
MCV RBC AUTO: 91.3 FL — SIGNIFICANT CHANGE UP (ref 80–100)
NRBC # FLD: 0 — SIGNIFICANT CHANGE UP
PLATELET # BLD AUTO: 131 K/UL — LOW (ref 150–400)
PMV BLD: 13.1 FL — HIGH (ref 7–13)
RBC # BLD: 3.2 M/UL — LOW (ref 3.8–5.2)
RBC # FLD: 15.4 % — HIGH (ref 10.3–14.5)
WBC # BLD: 12.5 K/UL — HIGH (ref 3.8–10.5)
WBC # FLD AUTO: 12.5 K/UL — HIGH (ref 3.8–10.5)

## 2017-09-06 RX ADMIN — Medication 500 MILLIGRAM(S): at 11:58

## 2017-09-06 RX ADMIN — HEPARIN SODIUM 5000 UNIT(S): 5000 INJECTION INTRAVENOUS; SUBCUTANEOUS at 11:59

## 2017-09-06 RX ADMIN — Medication 600 MILLIGRAM(S): at 23:00

## 2017-09-06 RX ADMIN — Medication 600 MILLIGRAM(S): at 18:12

## 2017-09-06 RX ADMIN — Medication 600 MILLIGRAM(S): at 12:45

## 2017-09-06 RX ADMIN — Medication 325 MILLIGRAM(S): at 11:58

## 2017-09-06 RX ADMIN — Medication 325 MILLIGRAM(S): at 18:12

## 2017-09-06 RX ADMIN — Medication 600 MILLIGRAM(S): at 11:58

## 2017-09-06 RX ADMIN — Medication 975 MILLIGRAM(S): at 06:25

## 2017-09-06 RX ADMIN — Medication 975 MILLIGRAM(S): at 00:08

## 2017-09-06 RX ADMIN — Medication 600 MILLIGRAM(S): at 18:45

## 2017-09-06 RX ADMIN — Medication 975 MILLIGRAM(S): at 23:01

## 2017-09-06 RX ADMIN — Medication 975 MILLIGRAM(S): at 18:13

## 2017-09-06 RX ADMIN — Medication 325 MILLIGRAM(S): at 08:07

## 2017-09-06 RX ADMIN — Medication 600 MILLIGRAM(S): at 06:25

## 2017-09-06 RX ADMIN — Medication 100 MILLIGRAM(S): at 18:12

## 2017-09-06 RX ADMIN — HEPARIN SODIUM 5000 UNIT(S): 5000 INJECTION INTRAVENOUS; SUBCUTANEOUS at 22:58

## 2017-09-06 RX ADMIN — Medication 600 MILLIGRAM(S): at 00:09

## 2017-09-06 RX ADMIN — Medication 600 MILLIGRAM(S): at 01:00

## 2017-09-06 RX ADMIN — HEPARIN SODIUM 5000 UNIT(S): 5000 INJECTION INTRAVENOUS; SUBCUTANEOUS at 00:08

## 2017-09-06 RX ADMIN — Medication 975 MILLIGRAM(S): at 11:59

## 2017-09-06 RX ADMIN — Medication 1 TABLET(S): at 11:58

## 2017-09-06 RX ADMIN — Medication 100 MILLIGRAM(S): at 06:25

## 2017-09-06 NOTE — PROGRESS NOTE ADULT - SUBJECTIVE AND OBJECTIVE BOX
Patient assessed at 0818.  Subjective  Pain: Patient denies any pain at the time of assessment. Pain being managed well by pain management protocol.  Complaints: None. Patient denies any headache, blur vision and/or dizziness.  Milestones:  Alert and orientedx3. Out of bed ambulating. Positive flatus. Positive bowel movement. Voiding.  Tolerating a regular diet.  Infant feeding: Breastfeeding  Feeding related issues and/or concerns:None    Objective  Vital Signs:  Vital Signs Last 24 Hrs  T(C): 36.8 (06 Sep 2017 06:00), Max: 37.2 (05 Sep 2017 21:27)  T(F): 98.2 (06 Sep 2017 06:00), Max: 99 (05 Sep 2017 21:27)  HR: 81 (06 Sep 2017 06:00) (79 - 91)  BP: 113/71 (06 Sep 2017 06:00) (113/71 - 119/74)  BP(mean): --  RR: 18 (06 Sep 2017 06:00) (18 - 19)  SpO2: 99% (06 Sep 2017 06:00) (98% - 99%)    Labs:                        9.6    12.50 )-----------( 131      ( 06 Sep 2017 05:30 )             29.2     Blood Type: Opositive  Rubella: Immune  RPR: nonreactive    Assessment  21y/o     Day#2    primary post-operative  section delivery for arrest of dilatation, arrest of descent                                        Condition: Stable  Past Medical History significant for: None  Current Issues: none  Breasts: soft, nontender  Nipples: intact  Abdomen: Soft, nondistended and nontender. Bowel sounds present. Fundus firm  Abdominal incision: Clean, dry and intact with steri strips. Patient encouraged to wear abdominal binder for support.  Vaginal: Lochia light rubra  Extremities: Edema noted bilaterally to lower extremities, negative Phu's Sign, nontender. Positive pedal pulses  Other relevant physical exam findings:    Plan  Plan: Increase ambulation, analgesia PRN and pain medication protocol standing oxycodone, ibuprofen and acetaminophen.  Diet: Regular diet  Orders: none    Continue routine post-operative and postpartum care.

## 2017-09-06 NOTE — PROGRESS NOTE ADULT - SUBJECTIVE AND OBJECTIVE BOX
Post-Operative Note, C/S  She is a  22y woman who is now post-operative day: 2    Subjective:  The patient feels well.  She is ambulating.   She is tolerating regular diet.  She denies nausea and vomiting; denies fever.  She is voiding.  Her pain is controlled; incisional pain is appropriate.  She reports normal postpartum bleeding.  Physical exam:    Vital Signs Last 24 Hrs  T(C): 36.7 (06 Sep 2017 14:01), Max: 37.2 (05 Sep 2017 21:27)  T(F): 98.1 (06 Sep 2017 14:01), Max: 99 (05 Sep 2017 21:27)  HR: 69 (06 Sep 2017 14:01) (69 - 81)  BP: 124/71 (06 Sep 2017 14:01) (113/71 - 124/71)  BP(mean): --  RR: 18 (06 Sep 2017 14:01) (18 - 19)  SpO2: 100% (06 Sep 2017 14:01) (98% - 100%)    Gen: NAD  Breast: Soft, nontender, not engorged.  Abdomen: Soft, nontender, no distension , firm uterine fundus at umbilicus.  Incision: C/D/I.  Pelvic: Normal lochia noted  Ext: No calf tenderness    LABS:                        9.6    12.50 )-----------( 131      ( 06 Sep 2017 05:30 )             29.2           Allergies    No Known Allergies          MEDICATIONS  (STANDING):  acetaminophen   Tablet 975 milliGRAM(s) Oral every 6 hours  diphtheria/tetanus/pertussis (acellular) Vaccine (ADAcel) 0.5 milliLiter(s) IntraMuscular once  prenatal multivitamin 1 Tablet(s) Oral daily  heparin  Injectable 5000 Unit(s) SubCutaneous every 12 hours  influenza   Vaccine 0.5 milliLiter(s) IntraMuscular once  oxyCODONE    IR 5 milliGRAM(s) Oral every 3 hours  ferrous    sulfate 325 milliGRAM(s) Oral three times a day with meals  docusate sodium 100 milliGRAM(s) Oral two times a day  ascorbic acid 500 milliGRAM(s) Oral daily  ibuprofen  Tablet 600 milliGRAM(s) Oral every 6 hours    MEDICATIONS  (PRN):  simethicone 80 milliGRAM(s) Chew every 4 hours PRN Gas  diphenhydrAMINE   Capsule 25 milliGRAM(s) Oral every 6 hours PRN Itching  glycerin Suppository - Adult 1 Suppository(s) Rectal at bedtime PRN Constipation  lanolin Ointment 1 Application(s) Topical every 3 hours PRN Sore Nipples  oxyCODONE    IR 5 milliGRAM(s) Oral every 4 hours PRN Severe Pain (7 - 10)

## 2017-09-06 NOTE — PROGRESS NOTE ADULT - ASSESSMENT
Assessment and Plan  POD #2 s/p C/S.  Doing well.  Encourage ambulation.  Incisional care and PO instructions reviewed.  [facility].

## 2017-09-07 VITALS
DIASTOLIC BLOOD PRESSURE: 73 MMHG | OXYGEN SATURATION: 100 % | RESPIRATION RATE: 17 BRPM | HEART RATE: 73 BPM | TEMPERATURE: 98 F | SYSTOLIC BLOOD PRESSURE: 101 MMHG

## 2017-09-07 RX ADMIN — Medication 600 MILLIGRAM(S): at 06:56

## 2017-09-07 RX ADMIN — Medication 975 MILLIGRAM(S): at 05:57

## 2017-09-07 RX ADMIN — Medication 600 MILLIGRAM(S): at 05:56

## 2017-09-07 RX ADMIN — Medication 600 MILLIGRAM(S): at 00:00

## 2017-09-07 RX ADMIN — Medication 100 MILLIGRAM(S): at 05:56

## 2017-09-07 NOTE — PROGRESS NOTE ADULT - SUBJECTIVE AND OBJECTIVE BOX
SUBJECTIVE:    Pain: Controlled    Complaints: None    MILESTONES:    Alert and Oriented x 3  [ x ]  Out of bed/ ambulating. [ x ]  Flatus:   Positive [ x ]  Negative [  ]  Bowel movement  [  ] Positive [  ] Negative   Voiding [x  ] Due to void [  ]   Pacheco/Indwelling catheter in place [  ]  Diet: Regular [ x ]  Clears [  ]  NPO [  ]    Infant feeding:  Breast [  ]   Bottle [  ]  Both [  ]  Feeding related issues and/or concerns:      OBJECTIVE:  T(C): 36.6 (17 @ 06:00), Max: 37 (17 @ 21:36)  HR: 73 (17 @ 06:00) (68 - 73)  BP: 101/73 (17 @ 06:00) (101/73 - 124/71)  RR: 17 (17 @ 06:00) (17 - 18)  SpO2: 100% (17 @ 06:00) (98% - 100%)  Wt(kg): --                        9.6    12.50 )-----------( 131      ( 06 Sep 2017 05:30 )             29.2           Blood Type: O Positive    RPR: Negative          MEDICATIONS  (STANDING):  acetaminophen   Tablet 975 milliGRAM(s) Oral every 6 hours  diphtheria/tetanus/pertussis (acellular) Vaccine (ADAcel) 0.5 milliLiter(s) IntraMuscular once  prenatal multivitamin 1 Tablet(s) Oral daily  heparin  Injectable 5000 Unit(s) SubCutaneous every 12 hours  influenza   Vaccine 0.5 milliLiter(s) IntraMuscular once  oxyCODONE    IR 5 milliGRAM(s) Oral every 3 hours  ferrous    sulfate 325 milliGRAM(s) Oral three times a day with meals  docusate sodium 100 milliGRAM(s) Oral two times a day  ascorbic acid 500 milliGRAM(s) Oral daily  ibuprofen  Tablet 600 milliGRAM(s) Oral every 6 hours    MEDICATIONS  (PRN):  simethicone 80 milliGRAM(s) Chew every 4 hours PRN Gas  diphenhydrAMINE   Capsule 25 milliGRAM(s) Oral every 6 hours PRN Itching  glycerin Suppository - Adult 1 Suppository(s) Rectal at bedtime PRN Constipation  lanolin Ointment 1 Application(s) Topical every 3 hours PRN Sore Nipples  oxyCODONE    IR 5 milliGRAM(s) Oral every 4 hours PRN Severe Pain (7 - 10)        ASSESSMENT:    22y     G 1     P   1001      PO Day#  3        Delivery: Primary [  ]    Repeat [  ]                                         Indication of procedure: Arrest    Condition: Stable    Past Medical History significant for: HPI:      Current Issues:    Breasts:  Soft [x  ]   Engorged [  ]  Nipples:  Abdomen: Soft [ x ]   Distended [  ] Nontender [  ]   Bowel sounds :  Present [  ]  Absent [  ]   Fundus:  Firm [x  ]  Boggy [  ]  Abdominal incision: Clean, Dry and Intact [x  ]  Staples [  ] Steri Strips [ x ] Dermabond [  ] Sutures [  ]    Patient wearing abdominal binder for support.    Vaginal: Lochia:  Heavy [  ]  Moderate [ x ]   Scant [  ]  Extremities: Edema [  ] Negative Phu's Sign [  ] Nontender [place]  [ x ] Positive pedal pulses [  ]    Other relevant physical exam findings:      PLAN:    Plan: Increase ambulation, analgesia PRN and pain medication protocol standing oxycodone, ibuprofen and acetaminophen.    Diet: Regular diet    Continue routine post-operative and postpartum care.     Discharge Planning [ x ]    For discharge Today  [ x   ]    Consults:  Social Work [  ]  Lactation [ x ]  Other [         ]

## 2017-09-15 ENCOUNTER — TRANSCRIPTION ENCOUNTER (OUTPATIENT)
Age: 22
End: 2017-09-15

## 2017-12-13 NOTE — DISCHARGE NOTE OB - FUNCTIONAL STATUS DATE
I will START or STAY ON the medications listed below when I get home from the hospital:    Percocet 5/325 oral tablet  -- 1-2 tab(s) by mouth every 4 hours, As Needed MDD:8 tabs    -- Caution federal law prohibits the transfer of this drug to any person other  than the person for whom it was prescribed.  May cause drowsiness.  Alcohol may intensify this effect.  Use care when operating dangerous machinery.  This prescription cannot be refilled.  This product contains acetaminophen.  Do not use  with any other product containing acetaminophen to prevent possible liver damage.  Using more of this medication than prescribed may cause serious breathing problems.    -- Indication: For pain I will START or STAY ON the medications listed below when I get home from the hospital:    Percocet 5/325 oral tablet  -- 1-2 tab(s) by mouth every 4 hours, As Needed MDD:8 tabs    -- Caution federal law prohibits the transfer of this drug to any person other  than the person for whom it was prescribed.  May cause drowsiness.  Alcohol may intensify this effect.  Use care when operating dangerous machinery.  This prescription cannot be refilled.  This product contains acetaminophen.  Do not use  with any other product containing acetaminophen to prevent possible liver damage.  Using more of this medication than prescribed may cause serious breathing problems.    -- Indication: For pain    Ecotrin 325 mg oral delayed release tablet  -- 1 tab(s) by mouth once a day   -- Swallow whole.  Do not crush.  Take with food or milk.    -- Indication: For vte ppx 05-Sep-2017 07-Sep-2017

## 2019-07-24 NOTE — DISCHARGE NOTE OB - NS MD DC PLAN IMMU FLU REF OTH
NEW OFFICE VISIT        Patient: Allison Grover Date of Service: 2019   : 1948 MRN: [id number]     SUBJECTIVE:   HISTORY OF PRESENT ILLNESS:  Allison Grover is a 71 year old female with HTN, COPD, CHF who is new to the area who presents to establish care.  She recently relocated from [place], [place].  She was seen by cardiology, pulmonology and gyne (fibroids) and would like names of local specialists.  She reports significant BLE edema on Norvasc - stopped on her own about 8 days ago because of it.  BP has been elevated since then and has experience some SAMPSON also.  Edema has improved though.   Denies chest pain, palpitations, fatigue, PND, orthopnea, syncope, presyncope, etc.      Reports hx of ARF about 1 year ago - 'dehydrated'.  IVF x several days.  Renal function has been 'normal' since then.      Limits her sodium intake.  Limits caffeine to 3/day (coffee, soda).  Is exercising regularly - swims.  Stopped smoking .  Rarely drinks.      Colonoscopy - normal .  Last MMG  - normal.  Pap was performed w/ eval by gyne for post menopausal bleeding (fibroids) .  Last Dexa  - ? Results.       PAST MEDICAL HISTORY:  No past medical history on file.    MEDICATIONS:  Current Outpatient Medications   Medication Sig   • carvedilol (COREG) 12.5 MG tablet Take 1 tablet by mouth 2 times daily (with meals).   • umeclidinium-vilanterol (ANORO ELLIPTA) 62.5-25 MCG/INH inhaler Inhale 1 puff into the lungs daily.   • losartan (COZAAR) 50 MG tablet Take 1 tablet by mouth daily.     No current facility-administered medications for this visit.        ALLERGIES:  ALLERGIES:  No Known Allergies    PAST SURGICAL HISTORY:  No past surgical history on file.    FAMILY HISTORY:  No family history on file.    SOCIAL HISTORY:  Social History     Tobacco Use   • Smoking status: Not on file   Substance Use Topics   • Alcohol use: Not on file   • Drug use: Not on file       Review of Systems    Constitutional: Negative for chills, fatigue, fever and unexpected weight change.   HENT: Negative for congestion, rhinorrhea, sore throat and trouble swallowing.    Eyes: Negative for visual disturbance.   Respiratory: Positive for shortness of breath. Negative for cough, chest tightness and wheezing.    Cardiovascular: Positive for leg swelling. Negative for chest pain and palpitations.   Gastrointestinal: Negative for abdominal pain, blood in stool, constipation, diarrhea, nausea and vomiting.   Genitourinary: Negative for dysuria, frequency, hematuria and urgency.   Musculoskeletal: Negative for arthralgias, back pain, gait problem and myalgias.   Skin: Negative for rash.   Neurological: Negative for dizziness, weakness, light-headedness, numbness and headaches.   Psychiatric/Behavioral: Negative for confusion, decreased concentration and sleep disturbance.         OBJECTIVE:     Visit Vitals  /82   Pulse 66   Resp 18   Ht 6' 1\" (1.854 m)   Wt 122.7 kg (270 lb 6.3 oz)   SpO2 93%   BMI 35.67 kg/m²         Physical Exam   Constitutional: She is oriented to person, place, and time. She appears well-developed and well-nourished. She is cooperative. No distress.   HENT:   Head: Normocephalic and atraumatic.   Right Ear: External ear and ear canal normal. No drainage or swelling. Tympanic membrane is not erythematous and not bulging. No middle ear effusion.   Left Ear: External ear and ear canal normal. No drainage or swelling. Tympanic membrane is not erythematous and not bulging.  No middle ear effusion.   Nose: Nose normal. No mucosal edema, rhinorrhea or septal deviation.   Mouth/Throat: Uvula is midline, oropharynx is clear and moist and mucous membranes are normal. No oropharyngeal exudate.   Eyes: Conjunctivae and lids are normal. Right conjunctiva is not injected. Left conjunctiva is not injected.   Neck: Trachea normal. Neck supple. No thyromegaly present.   Cardiovascular: Normal rate, S1 normal, S2  normal, normal heart sounds, intact distal pulses and normal pulses.   No murmur heard.  Pulmonary/Chest: Effort normal and breath sounds normal. No respiratory distress. She has no wheezes. She has no rales.   Abdominal: Soft. Normal appearance and bowel sounds are normal. She exhibits no distension. There is no tenderness.   Musculoskeletal: She exhibits no edema.   Neurological: She is alert and oriented to person, place, and time.   Skin: Skin is warm, dry and intact. No rash noted. She is not diaphoretic.   Psychiatric: She has a normal mood and affect. Her speech is normal and behavior is normal.         DIAGNOSTIC STUDIES:   LAB RESULTS:    None.    Assessment AND PLAN:        Essential hypertension - elevated, but is not taking Norvasc (10mg) due to significant edema. Has tolerated ARB (cough on ACEI).  Trial of Cozaar 50mg.  BP check 1 week.  Continue Coreg.  To establish w/ cardiology.  Will have labs performed and follow up.      - carvedilol (COREG) 12.5 MG tablet; Take 1 tablet by mouth 2 times daily (with meals).  Dispense: 30 tablet; Refill: 1  - losartan (COZAAR) 50 MG tablet; Take 1 tablet by mouth daily.  Dispense: 30 tablet; Refill: 1      Chronic obstructive pulmonary disease, unspecified COPD type - stopped smoking 2010.  Doing well w/ Anoro Ellipta.  (SAMPSON only since stopping Norvasc.)  To establish w/ pulmonology.     - umeclidinium-vilanterol (ANORO ELLIPTA) 62.5-25 MCG/INH inhaler; Inhale 1 puff into the lungs daily.  Dispense: 30 each; Refill: 1      Chronic congestive heart failure, unspecified heart failure type - trace edema BLE.  Establishing w/ cardiology.  Treating BP (adding Cozaar to Coreg).  Returns next week for BP check and labs.        Health care maintenance - MMG order today.  Diet/exercise, wt loss goals reviewed.  Will get baseline labs and have her follow up.     - MAMMO SCREENING BILATERAL W CIRA; Future         Instructions provided as documented in the AVS.    The patient  indicated understanding of the diagnosis and agreed with the plan of care.        Tesfaye Maldonado MD             Refused

## 2021-01-05 NOTE — PATIENT PROFILE OB - BABY A: DATE/TIME OF DELIVERY
Subjective   Lana Yañez is a 74 y.o. female.   CC: cough    You have chosen to receive care through a telephone visit. Do you consent to use a telephone visit for your medical care today? Yes    History of Present Illness   Lana is a 74 year old female for a telephone visit.  She states that she has been sick since over the weekend.  She was out in the rain and that is when it started.  She has a cough which is productive of yellow phlegm.  She has been sneezing more than usual.  She denies shortness of breath.  No pain with cough or deep breath.  States that she recently saw pulmonologist and was told that she did not have asthma and did not need an inhaler.  She has not had fever, chills or sweats.  Just checked her temperature this morning and it was 97.3.  No known exposure to COVID or to anyone who has been ill.  Also has a lot of nasal congestion.        The following portions of the patient's history were reviewed and updated as appropriate: allergies, current medications, past medical history, past social history and problem list.    Review of Systems   HENT: Positive for congestion, postnasal drip, rhinorrhea, sneezing and sore throat. Negative for ear pain.    Respiratory: Positive for cough.    Cardiovascular: Negative for chest pain, palpitations and leg swelling.   Gastrointestinal: Negative for constipation, diarrhea, nausea and vomiting.   Skin: Negative for rash.   Neurological: Negative for dizziness and headaches.       Objective   Physical Exam  Vital signs/physical exam not done since this was a telephone visit.    Assessment/Plan   Diagnoses and all orders for this visit:    1. Bronchitis (Primary)  -     azithromycin (Zithromax Z-Micha) 250 MG tablet; Take 2 tablets the first day, then 1 tablet daily for 4 days.  Dispense: 6 tablet; Refill: 0    2. Cough  -     COVID-19,[facility] ROUTINE, NP/OP SWAB IN TRANSPORT MEDIA OR ESWAB 72 HR TAT - Swab, Oropharynx      Advised that if she should  develop shortness of breath, fever, chills or other symptoms of concern she should go directly to the ER.    Time spent is 10 minutes.    Pharmacist called concerning the Azithromycin that had been called in for her.  She is on Digoxin and this is contraindicated.  Will send in new script for Amoxicillin 250 mg tid for 5 days.  This was eprescribed to [facility].      04-Sep-2017 02:25

## 2021-07-12 ENCOUNTER — EMERGENCY (EMERGENCY)
Facility: HOSPITAL | Age: 26
LOS: 1 days | Discharge: HOME | End: 2021-07-12
Admitting: EMERGENCY MEDICINE
Payer: MEDICAID

## 2021-07-12 VITALS
OXYGEN SATURATION: 100 % | RESPIRATION RATE: 17 BRPM | TEMPERATURE: 99 F | HEIGHT: 58 IN | SYSTOLIC BLOOD PRESSURE: 120 MMHG | DIASTOLIC BLOOD PRESSURE: 83 MMHG | HEART RATE: 88 BPM

## 2021-07-12 PROCEDURE — 99284 EMERGENCY DEPT VISIT MOD MDM: CPT

## 2021-07-12 PROCEDURE — 71046 X-RAY EXAM CHEST 2 VIEWS: CPT | Mod: 26

## 2021-07-12 NOTE — ED PROVIDER NOTE - CLINICAL SUMMARY MEDICAL DECISION MAKING FREE TEXT BOX
26yF w/pmhx seasonal allergies p/w throat pain x 2 months. On exam pt is well appearing, VSS, afebrile, throat non erythematous no exudate no tonsillar swelling, no lymphadenopathy. Lungs clear bilaterally. Given reports of dry cough and intermittent SOB x 2 months will get CXR to r/o pneumonia, lung pathology. Likely allergies. Plan: chest xray, will swab throat for strep. Disuccsed with pt she will need to follow up with an ENT. 26yF w/pmhx seasonal allergies p/w throat pain x 2 months. On exam pt is well appearing, VSS, afebrile, throat non erythematous no exudate no tonsillar swelling, no lymphadenopathy. Lungs clear bilaterally. Given reports of dry cough and intermittent SOB x 2 months will get CXR to r/o pneumonia, lung pathology. Likely allergies, possible gerd?. Plan: chest xray, will swab throat for strep. Discussed with pt she will need to follow up with an ENT.

## 2021-07-12 NOTE — ED PROVIDER NOTE - PROGRESS NOTE DETAILS
wet read normal for chest xay, pt seen by mily/jami burks KELLEY Darling" wet read normal for chest xay, pt seen by d/jami burks to help facilitate ENT follow up.  ID [id number]

## 2021-07-12 NOTE — ED PROVIDER NOTE - PATIENT PORTAL LINK FT
You can access the FollowMyHealth Patient Portal offered by [facility] by registering at the following website: [email or web address]. By joining [employer]’s FollowMyHealth portal, you will also be able to view your health information using other applications (apps) compatible with our system.

## 2021-07-12 NOTE — ED ADULT TRIAGE NOTE - CHIEF COMPLAINT QUOTE
pt c/o throat pain x 2 months, went to [facility] care, given abx, completed course, but states pain returned. denies fever/chills, sob, v/d. c/o a non productive cough 5x a day.

## 2021-07-12 NOTE — ED PROVIDER NOTE - OBJECTIVE STATEMENT
26yF w/pmhx seasonal allergies p/w throat pain x 2 months. 26yF w/pmhx seasonal allergies p/w throat pain x 2 months. Pt states she was seen at  and given abx for a throat infection which she completed 2-3 weeks ago. Reports initially pain was relieved but returned soon after finishing the abx. She describes pain as "dryness" and "sticking sensation". Associated she has dry cough and intermittent SOB for which she is using an albuterol inhaler. Pt denies fever/chills, chest pain, difficulty speaking or swallowing, drooling, headache, dizziness, nasal congestion, abd pain, n/v/d, recent travel or any other concerns. Of note pt reports where she works there is a lot of dust which she thinks makes her symptoms worse.   ID [id number]

## 2021-07-12 NOTE — ED PROVIDER NOTE - NSFOLLOWUPINSTRUCTIONS_ED_ALL_ED_FT
Follow up with an ENT within 1 week, referral list provided, you can also call the clinic to make an appointment at [phone]  Follow up with your primary care doctor within 1 week  Return to the ER with any worsening or concerning symptoms, increased pain, fever/chills, difficulty swallowing or any other concerns.    Seguimiento Car un seguimiento con un otorrinolaringólogo dentro de 1 semana, se proporciona la lista de referencias, también puede llamar a la clínica para hacer jose angie al [phone]  Car un seguimiento con tanner médico de atención primaria dentro de 1 semana  Regrese a la thuan de emergencias con cualquier síntoma que empeore o le preocupe, aumento del dolor, fiebre / escalofríos, dificultad para tragar o cualquier otra inquietud. Consulte con un otorrinolaringólogo dentro de 1 semana, se proporciona la lista de referencias, también puede llamar a la clínica para programar jose angie al [phone]  Car un seguimiento con tanner médico de atención primaria dentro de 1 semana  Regrese a la thuan de emergencias con cualquier síntoma que empeore o le preocupe, aumento del dolor, fiebre / escalofríos, dificultad para tragar o cualquier otra inquietud.

## 2021-07-14 LAB
CULTURE RESULTS: SIGNIFICANT CHANGE UP
SPECIMEN SOURCE: SIGNIFICANT CHANGE UP

## 2021-07-26 ENCOUNTER — APPOINTMENT (OUTPATIENT)
Dept: OTOLARYNGOLOGY | Facility: CLINIC | Age: 26
End: 2021-07-26

## 2023-04-17 ENCOUNTER — EMERGENCY (EMERGENCY)
Facility: HOSPITAL | Age: 28
LOS: 1 days | Discharge: HOME | End: 2023-04-17
Attending: EMERGENCY MEDICINE | Admitting: EMERGENCY MEDICINE
Payer: MEDICAID

## 2023-04-17 VITALS
RESPIRATION RATE: 18 BRPM | OXYGEN SATURATION: 100 % | SYSTOLIC BLOOD PRESSURE: 117 MMHG | HEART RATE: 81 BPM | TEMPERATURE: 98 F | DIASTOLIC BLOOD PRESSURE: 71 MMHG

## 2023-04-17 LAB
ALBUMIN SERPL ELPH-MCNC: 4.7 G/DL — SIGNIFICANT CHANGE UP (ref 3.3–5)
ALP SERPL-CCNC: 85 U/L — SIGNIFICANT CHANGE UP (ref 40–120)
ALT FLD-CCNC: 15 U/L — SIGNIFICANT CHANGE UP (ref 4–33)
ANION GAP SERPL CALC-SCNC: 12 MMOL/L — SIGNIFICANT CHANGE UP (ref 7–14)
AST SERPL-CCNC: 22 U/L — SIGNIFICANT CHANGE UP (ref 4–32)
BASOPHILS # BLD AUTO: 0.04 K/UL — SIGNIFICANT CHANGE UP (ref 0–0.2)
BASOPHILS NFR BLD AUTO: 0.3 % — SIGNIFICANT CHANGE UP (ref 0–2)
BILIRUB SERPL-MCNC: 0.7 MG/DL — SIGNIFICANT CHANGE UP (ref 0.2–1.2)
BUN SERPL-MCNC: 15 MG/DL — SIGNIFICANT CHANGE UP (ref 7–23)
CALCIUM SERPL-MCNC: 9.3 MG/DL — SIGNIFICANT CHANGE UP (ref 8.4–10.5)
CHLORIDE SERPL-SCNC: 101 MMOL/L — SIGNIFICANT CHANGE UP (ref 98–107)
CO2 SERPL-SCNC: 23 MMOL/L — SIGNIFICANT CHANGE UP (ref 22–31)
CREAT SERPL-MCNC: 0.52 MG/DL — SIGNIFICANT CHANGE UP (ref 0.5–1.3)
EGFR: 131 ML/MIN/1.73M2 — SIGNIFICANT CHANGE UP
EOSINOPHIL # BLD AUTO: 0.12 K/UL — SIGNIFICANT CHANGE UP (ref 0–0.5)
EOSINOPHIL NFR BLD AUTO: 0.9 % — SIGNIFICANT CHANGE UP (ref 0–6)
GLUCOSE SERPL-MCNC: 112 MG/DL — HIGH (ref 70–99)
HCT VFR BLD CALC: 41.1 % — SIGNIFICANT CHANGE UP (ref 34.5–45)
HGB BLD-MCNC: 13.5 G/DL — SIGNIFICANT CHANGE UP (ref 11.5–15.5)
IANC: 10.36 K/UL — HIGH (ref 1.8–7.4)
IMM GRANULOCYTES NFR BLD AUTO: 0.3 % — SIGNIFICANT CHANGE UP (ref 0–0.9)
LYMPHOCYTES # BLD AUTO: 19.1 % — SIGNIFICANT CHANGE UP (ref 13–44)
LYMPHOCYTES # BLD AUTO: 2.62 K/UL — SIGNIFICANT CHANGE UP (ref 1–3.3)
MCHC RBC-ENTMCNC: 29.5 PG — SIGNIFICANT CHANGE UP (ref 27–34)
MCHC RBC-ENTMCNC: 32.8 GM/DL — SIGNIFICANT CHANGE UP (ref 32–36)
MCV RBC AUTO: 89.9 FL — SIGNIFICANT CHANGE UP (ref 80–100)
MONOCYTES # BLD AUTO: 0.55 K/UL — SIGNIFICANT CHANGE UP (ref 0–0.9)
MONOCYTES NFR BLD AUTO: 4 % — SIGNIFICANT CHANGE UP (ref 2–14)
NEUTROPHILS # BLD AUTO: 10.36 K/UL — HIGH (ref 1.8–7.4)
NEUTROPHILS NFR BLD AUTO: 75.4 % — SIGNIFICANT CHANGE UP (ref 43–77)
NRBC # BLD: 0 /100 WBCS — SIGNIFICANT CHANGE UP (ref 0–0)
NRBC # FLD: 0 K/UL — SIGNIFICANT CHANGE UP (ref 0–0)
PLATELET # BLD AUTO: 270 K/UL — SIGNIFICANT CHANGE UP (ref 150–400)
POTASSIUM SERPL-MCNC: 3.7 MMOL/L — SIGNIFICANT CHANGE UP (ref 3.5–5.3)
POTASSIUM SERPL-SCNC: 3.7 MMOL/L — SIGNIFICANT CHANGE UP (ref 3.5–5.3)
PROT SERPL-MCNC: 7.6 G/DL — SIGNIFICANT CHANGE UP (ref 6–8.3)
RBC # BLD: 4.57 M/UL — SIGNIFICANT CHANGE UP (ref 3.8–5.2)
RBC # FLD: 12.3 % — SIGNIFICANT CHANGE UP (ref 10.3–14.5)
SODIUM SERPL-SCNC: 136 MMOL/L — SIGNIFICANT CHANGE UP (ref 135–145)
WBC # BLD: 13.73 K/UL — HIGH (ref 3.8–10.5)
WBC # FLD AUTO: 13.73 K/UL — HIGH (ref 3.8–10.5)

## 2023-04-17 PROCEDURE — 99284 EMERGENCY DEPT VISIT MOD MDM: CPT

## 2023-04-17 PROCEDURE — 93010 ELECTROCARDIOGRAM REPORT: CPT

## 2023-04-17 RX ORDER — MECLIZINE HCL 12.5 MG
25 TABLET ORAL ONCE
Refills: 0 | Status: COMPLETED | OUTPATIENT
Start: 2023-04-17 | End: 2023-04-17

## 2023-04-17 RX ORDER — IBUPROFEN 200 MG
600 TABLET ORAL ONCE
Refills: 0 | Status: COMPLETED | OUTPATIENT
Start: 2023-04-17 | End: 2023-04-17

## 2023-04-17 RX ORDER — SODIUM CHLORIDE 9 MG/ML
1000 INJECTION INTRAMUSCULAR; INTRAVENOUS; SUBCUTANEOUS ONCE
Refills: 0 | Status: COMPLETED | OUTPATIENT
Start: 2023-04-17 | End: 2023-04-17

## 2023-04-17 RX ORDER — MECLIZINE HCL 12.5 MG
1 TABLET ORAL
Qty: 15 | Refills: 0
Start: 2023-04-17 | End: 2023-04-21

## 2023-04-17 RX ORDER — ACETAMINOPHEN 500 MG
650 TABLET ORAL ONCE
Refills: 0 | Status: COMPLETED | OUTPATIENT
Start: 2023-04-17 | End: 2023-04-17

## 2023-04-17 RX ORDER — METOCLOPRAMIDE HCL 10 MG
10 TABLET ORAL ONCE
Refills: 0 | Status: COMPLETED | OUTPATIENT
Start: 2023-04-17 | End: 2023-04-17

## 2023-04-17 RX ORDER — DIPHENHYDRAMINE HCL 50 MG
25 CAPSULE ORAL ONCE
Refills: 0 | Status: COMPLETED | OUTPATIENT
Start: 2023-04-17 | End: 2023-04-17

## 2023-04-17 RX ADMIN — Medication 25 MILLIGRAM(S): at 23:46

## 2023-04-17 RX ADMIN — Medication 10 MILLIGRAM(S): at 23:21

## 2023-04-17 RX ADMIN — Medication 650 MILLIGRAM(S): at 22:25

## 2023-04-17 RX ADMIN — Medication 600 MILLIGRAM(S): at 23:21

## 2023-04-17 RX ADMIN — SODIUM CHLORIDE 1000 MILLILITER(S): 9 INJECTION INTRAMUSCULAR; INTRAVENOUS; SUBCUTANEOUS at 23:21

## 2023-04-17 RX ADMIN — Medication 25 MILLIGRAM(S): at 22:25

## 2023-04-17 NOTE — ED PROVIDER NOTE - NSFOLLOWUPINSTRUCTIONS_ED_ALL_ED_FT
Tienes vértigo posicional davey, es jose de las causas más comunes de vértigo (el vértigo es la sensación repentina de que estás dando vueltas o que el interior de tu anshu da vueltas). Puede causar mareos de leves a severos, y es más comúnmente provocado por cambios de posición, esto ocurre especialmente cuando inclina la anshu hacia arriba o hacia abajo, cuando se acuesta, cuando se da la vuelta o cuando se sienta en la cama. Por ejemplo. A menudo se puede acompañar de náuseas, vómitos y pérdida del equilibrio al dormir. A veces desaparece por sí solo, o puede buscar lo que se llama terapia vestibular para ayudar. Asegúrese de tomarse tanner tiempo al cambiar de posición y de  la anshu muy lentamente. Si se siente mareado, asegúrese de sentarse rápidamente para evitar caerse. Si nota que andrae síntomas empeoran drásticamente o cambian de carácter, puede buscar jose afección médica de emergencia para asegurarse de que no [place] tenido otro problema neurológico, rolf un accidente cerebrovascular, que a veces puede tener síntomas similares al vértigo. También puede tanner medicamentos rolf meclizina para ayudar con andrae síntomas.

## 2023-04-17 NOTE — ED PROVIDER NOTE - OBJECTIVE STATEMENT
28-year-old female no past medical history presents to the ED for 1 day of acute onset dizziness.  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  In the past has felt minor episodes of dizziness but has never been this pattern associated with vomiting.  Denies any headache.  Says the dizziness is worse with head movement and she feels off balance when she walks.  Denies any history of migraines or headaches in the past.  Taking medication prior to coming in.  On arrival vitals within normal limits.

## 2023-04-17 NOTE — ED PROVIDER NOTE - NS ED ROS FT
General: denies fever, chills  HENT: denies nasal congestion, rhinorrhea  Eyes: denies visual changes, blurred vision  CV: denies chest pain, palpitations  Resp: denies difficulty breathing, cough  Abdominal: +nausea, vomiting, denies diarrhea, abdominal pain  Neuro:+dizziness, denies headaches, numbness, tingling  Skin: denies rashes, bruises

## 2023-04-17 NOTE — ED PROVIDER NOTE - NSFOLLOWUPCLINICS_GEN_ALL_ED_FT
[facility]  Neurology  [address] [place] [address] [address] [place] [address]  Phone: [phone]  Fax:

## 2023-04-17 NOTE — ED PROVIDER NOTE - ATTENDING CONTRIBUTION TO CARE
28 female complaining of episodic dizziness, positive nausea and vomiting.  Patient had sudden onset of symptoms today and felt room spinning dizziness which improved with staying still or lying down but returned.  No headache no neck pain no ear complaints.  No recent illness, no fever/chills no medications.  Patient states has had mild episodic dizziness with short-lived spinning in the past but is unclear whether this is very similar.  Patient able to walk without assistance in ED but has mild symptoms with turning head and movement.  MMM, PAO, EOMI, no diplopia, mild L gaze nystagmus, 3-5 beats, clear lungs, heart reg, abd soft, NT to palp, no shaina/guarding, no CVAT, no edema, NT calves, CN 3-12 intact, neg [place] but sx reproduced w head movement to L and sitting up.

## 2023-04-17 NOTE — ED PROVIDER NOTE - PATIENT PORTAL LINK FT
You can access the FollowMyHealth Patient Portal offered by [facility] by registering at the following website: [email or web address]. By joining [employer]’s FollowMyHealth portal, you will also be able to view your health information using other applications (apps) compatible with our system.

## 2023-04-17 NOTE — ED PROVIDER NOTE - PHYSICAL EXAMINATION
GENERAL: well appearing in no acute distress, non-toxic appearing  CARDIAC: regular rate and rhythm, normal S1S2, no appreciable murmurs,   PULM: normal breath sounds, clear to ascultation bilaterally, no rales, rhonchi, wheezing  GI: abdomen nondistended, soft, nontender, no guarding, rebound tenderness  NEURO: no focal motor or sensory deficits, CN2-12 intact, normal speech, PERRLA, EOMI, able to ambulate  without assistance. no nystagmus elicited on exam. dizziness reproducible with sudden head movements.   PSYCH: appropriate mood and affect

## 2023-04-17 NOTE — ED PROVIDER NOTE - CLINICAL SUMMARY MEDICAL DECISION MAKING FREE TEXT BOX
27 yo f no mhx here for an episode of  room spinning dizziness not associated w ha, or vision change.  on exam  no nystagmus elicited but reproducible dizziness w sudden head movements. no other fnd on exam. able to ambulate w out assistance. likely peripheral vertigo vs complex migraine ,d o not think this is central given no vertical nystagmus, first day of sx, not constant dizziness,  no other cerebellar signs on exam.

## 2023-04-17 NOTE — ED ADULT NURSE NOTE - OBJECTIVE STATEMENT
29 y/o F presents to ED intake A&Ox4 c/o dizziness and nausea x 1 day. associated vomiting; 3 episodes today. denies PMHx. abd soft, non distened. no acute distress noted. respirations even and unlabored. awaiting UCG results. safety maintained. brother at bedside Sprain of collateral ligament of right knee, initial encounter

## 2023-04-18 VITALS
DIASTOLIC BLOOD PRESSURE: 60 MMHG | SYSTOLIC BLOOD PRESSURE: 131 MMHG | RESPIRATION RATE: 18 BRPM | OXYGEN SATURATION: 100 % | TEMPERATURE: 98 F | HEART RATE: 98 BPM

## 2023-07-26 NOTE — ED ADULT TRIAGE NOTE - CHIEF COMPLAINT QUOTE
Medication: Ritalin LA 30mg #90 W/ 0 refills & Ritalin 10mg #120 w/ 0 refills  Next OV: 9/13/23  Last office visit date: 3/3/23  Preferred pharmacy: [facility] reviewed and Ritalin LA & Ritalin both last dispensed on 6/29/23. Ok to place refills on file today, pharmacy has medication in stock?     pt c/o blood in urine x a day. states lower abdominal pain when urinates on left side and bilateral flank pain. pt appears comfortable and in NAD.